# Patient Record
Sex: FEMALE | URBAN - METROPOLITAN AREA
[De-identification: names, ages, dates, MRNs, and addresses within clinical notes are randomized per-mention and may not be internally consistent; named-entity substitution may affect disease eponyms.]

---

## 2024-11-05 ENCOUNTER — APPOINTMENT (OUTPATIENT)
Dept: PRIMARY CARE | Facility: CLINIC | Age: 39
End: 2024-11-05
Payer: COMMERCIAL

## 2024-11-05 VITALS
BODY MASS INDEX: 25.71 KG/M2 | HEIGHT: 64 IN | WEIGHT: 150.6 LBS | TEMPERATURE: 97.8 F | HEART RATE: 69 BPM | SYSTOLIC BLOOD PRESSURE: 107 MMHG | DIASTOLIC BLOOD PRESSURE: 60 MMHG

## 2024-11-05 DIAGNOSIS — Z12.31 ENCOUNTER FOR SCREENING MAMMOGRAM FOR BREAST CANCER: ICD-10-CM

## 2024-11-05 DIAGNOSIS — Z00.00 LABORATORY EXAMINATION ORDERED AS PART OF A ROUTINE GENERAL MEDICAL EXAMINATION: ICD-10-CM

## 2024-11-05 DIAGNOSIS — Z76.89 ENCOUNTER TO ESTABLISH CARE WITH NEW DOCTOR: ICD-10-CM

## 2024-11-05 DIAGNOSIS — E11.9 TYPE 2 DIABETES MELLITUS WITHOUT COMPLICATION, WITHOUT LONG-TERM CURRENT USE OF INSULIN (MULTI): Primary | ICD-10-CM

## 2024-11-05 PROBLEM — O24.419 GESTATIONAL DIABETES MELLITUS (GDM), ANTEPARTUM (HHS-HCC): Status: ACTIVE | Noted: 2024-11-05

## 2024-11-05 PROBLEM — O24.419 GESTATIONAL DIABETES MELLITUS (GDM), ANTEPARTUM (HHS-HCC): Status: RESOLVED | Noted: 2024-11-05 | Resolved: 2024-11-05

## 2024-11-05 PROCEDURE — 3008F BODY MASS INDEX DOCD: CPT | Performed by: STUDENT IN AN ORGANIZED HEALTH CARE EDUCATION/TRAINING PROGRAM

## 2024-11-05 PROCEDURE — 1036F TOBACCO NON-USER: CPT | Performed by: STUDENT IN AN ORGANIZED HEALTH CARE EDUCATION/TRAINING PROGRAM

## 2024-11-05 PROCEDURE — 3074F SYST BP LT 130 MM HG: CPT | Performed by: STUDENT IN AN ORGANIZED HEALTH CARE EDUCATION/TRAINING PROGRAM

## 2024-11-05 PROCEDURE — 99204 OFFICE O/P NEW MOD 45 MIN: CPT | Performed by: STUDENT IN AN ORGANIZED HEALTH CARE EDUCATION/TRAINING PROGRAM

## 2024-11-05 PROCEDURE — 3078F DIAST BP <80 MM HG: CPT | Performed by: STUDENT IN AN ORGANIZED HEALTH CARE EDUCATION/TRAINING PROGRAM

## 2024-11-05 RX ORDER — LIRAGLUTIDE 6 MG/ML
3 INJECTION, SOLUTION SUBCUTANEOUS DAILY
COMMUNITY

## 2024-11-05 ASSESSMENT — ENCOUNTER SYMPTOMS
HEADACHES: 0
COLOR CHANGE: 0
DEPRESSION: 0
CONSTIPATION: 0
COUGH: 0
OCCASIONAL FEELINGS OF UNSTEADINESS: 0
SHORTNESS OF BREATH: 0
LOSS OF SENSATION IN FEET: 0
DIFFICULTY URINATING: 0
FATIGUE: 0
WHEEZING: 0
DIZZINESS: 0
CHILLS: 0
ADENOPATHY: 0
NAUSEA: 0
DIARRHEA: 0
VOMITING: 0
ABDOMINAL PAIN: 0
FEVER: 0

## 2024-11-05 NOTE — PROGRESS NOTES
[facility] - Primary Care  [address], [address]  [place] [address]    Subjective     Patient ID: Sumeet Costello is a 39 y.o. female who presents for  Establish Care     Patient has a history of type 2 diabetes mellitus.  Developed diabetes gestationally with her pregnancy, persisted in the postpartum period.  She was put on Saxenda while she was living in [place] to help control her blood sugars, her blood sugars have been at goal and she has been tolerating the Saxenda.  However she wanted to see if she could try semaglutide instead as the daily injections are very bothersome.    Did have labs and A1c checked by her doctor in [place] in the past.    Family history of breast cancer in her mom.  Needs yearly mammograms.  Did get BRCA tested and is negative.  Typically would get a mammogram with an ultrasound while living in [place].    No hx of MEN or thyroid cancer in family or personally.        Review of Systems   Constitutional:  Negative for chills, fatigue and fever.   HENT:  Negative for congestion.    Eyes:  Negative for visual disturbance.   Respiratory:  Negative for cough, shortness of breath and wheezing.    Cardiovascular:  Negative for chest pain.   Gastrointestinal:  Negative for abdominal pain, constipation, diarrhea, nausea and vomiting.   Genitourinary:  Negative for difficulty urinating.   Musculoskeletal:  Negative for gait problem.   Skin:  Negative for color change.   Neurological:  Negative for dizziness, syncope and headaches.   Hematological:  Negative for adenopathy.       Social History     Tobacco Use    Smoking status: Never    Smokeless tobacco: Never   Substance Use Topics    Alcohol use: Never    Drug use: Never     Family History   Problem Relation Name Age of Onset    Breast cancer Mother       Allergies   Allergen Reactions    Dipyrone Hives       /60 (BP Location: Right arm, Patient Position: Sitting, BP Cuff Size: Adult)   Pulse 69   Temp 36.6 °C (97.8  "°F) (Temporal)   Ht 1.626 m (5' 4\")   Wt 68.3 kg (150 lb 9.6 oz)   BMI 25.85 kg/m²      Objective   Physical Exam  Vitals and nursing note reviewed.   Constitutional:       Appearance: Normal appearance.   Eyes:      Extraocular Movements: Extraocular movements intact.      Conjunctiva/sclera: Conjunctivae normal.      Pupils: Pupils are equal, round, and reactive to light.   Cardiovascular:      Rate and Rhythm: Normal rate and regular rhythm.   Pulmonary:      Effort: Pulmonary effort is normal.      Breath sounds: Normal breath sounds.   Abdominal:      General: Abdomen is flat. Bowel sounds are normal.      Palpations: Abdomen is soft.   Musculoskeletal:         General: Normal range of motion.      Cervical back: Normal range of motion and neck supple.   Skin:     General: Skin is warm and dry.      Capillary Refill: Capillary refill takes less than 2 seconds.   Neurological:      General: No focal deficit present.      Mental Status: She is alert and oriented to person, place, and time.   Psychiatric:         Mood and Affect: Mood normal.         Behavior: Behavior normal.         Thought Content: Thought content normal.           Labs: No results found for: \"WBC\", \"HGB\", \"HCT\", \"PLT\", \"TSH\", \"PSA\", \"INR\", \"GLUF\"  No results found for: \"NA\", \"K\", \"CL\", \"BUN\", \"CREATININE\", \"GLUCOSE\", \"CALCIUM\", \"PROT\", \"BILITOT\", \"ALKPHOS\", \"AST\", \"ALT\", \"AGRATIO\", \"GLOB\"  No results found for: \"CHLPL\", \"CHOL\" No results found for: \"TRIG\" No results found for: \"HDL\"  No results found for: \"LDLCALC\" No results found for: \"VLDL\" No components found for: \"CHOLHDLRATI0\"    Imaging/Testing: No image results found.    Assessment/Plan   Problem List Items Addressed This Visit       Type 2 diabetes mellitus, without long-term current use of insulin (Multi) - Primary     Chronic issue, stable, initial encounter. We can swap to ozempic to see if it is more tolerable as it is less injections, will need to start at initial dose and " titrate to 1mg. Will check DMT2 labs to assess control. Continue with lifestyle and diet changes.         Relevant Medications    semaglutide 0.25 mg or 0.5 mg (2 mg/3 mL) pen injector    Other Relevant Orders    CBC and Auto Differential    Comprehensive Metabolic Panel    Lipid Panel    Hemoglobin A1c    Albumin-Creatinine Ratio, Urine Random    Follow Up In Primary Care - Established     Other Visit Diagnoses       Encounter for screening mammogram for breast cancer        Relevant Orders    BI mammo bilateral screening tomosynthesis    Laboratory examination ordered as part of a routine general medical examination        Encounter to establish care with new doctor        Relevant Orders    Referral to Obstetrics / Gynecology            As part of today's Preventative Visit, an age and gender-appropriate history and physical was performed, as documented below. Counseling and anticipatory guidance were performed, and risk factor reduction interventions (including [place] Preventative Services Task Force recommended screening tests) were utilized/ordered as outlined in the above Assessment and Plan. All patient medications were reviewed, and refilled if necessary. Patient and I discussed diet/nutrition, lifestyle modifications, safety, medication indications and side effects, and health goals.    Patient and I discussed diet/nutrition, lifestyle modifications, safety, medication indications and side effects, and health goals.    current treatment plan is effective, no change in therapy, orders and follow up as documented in EMR, lab results reviewed with patient, repeat labs ordered prior to next appointment, reviewed compliance with lifestyle measures, reviewed diet, exercise and weight control, reviewed medications and side effects in detail     Return visit in 2 months.         PATRICIA TANG MD, [facility]  Department of [facility] of [facility] - Primary Care

## 2024-11-05 NOTE — ASSESSMENT & PLAN NOTE
Chronic issue, stable, initial encounter. We can swap to ozempic to see if it is more tolerable as it is less injections, will need to start at initial dose and titrate to 1mg. Will check DMT2 labs to assess control. Continue with lifestyle and diet changes.

## 2025-01-30 DIAGNOSIS — E11.9 TYPE 2 DIABETES MELLITUS WITHOUT COMPLICATION, WITHOUT LONG-TERM CURRENT USE OF INSULIN (MULTI): ICD-10-CM
